# Patient Record
Sex: FEMALE | URBAN - METROPOLITAN AREA
[De-identification: names, ages, dates, MRNs, and addresses within clinical notes are randomized per-mention and may not be internally consistent; named-entity substitution may affect disease eponyms.]

---

## 2023-12-20 ENCOUNTER — APPOINTMENT (RX ONLY)
Dept: URBAN - METROPOLITAN AREA CLINIC 87 | Facility: CLINIC | Age: 23
Setting detail: DERMATOLOGY
End: 2023-12-20

## 2023-12-20 DIAGNOSIS — D49.2 NEOPLASM OF UNSPECIFIED BEHAVIOR OF BONE, SOFT TISSUE, AND SKIN: ICD-10-CM

## 2023-12-20 PROCEDURE — ? PHOTO-DOCUMENTATION

## 2023-12-20 PROCEDURE — ? ADDITIONAL NOTES

## 2023-12-20 PROCEDURE — 99202 OFFICE O/P NEW SF 15 MIN: CPT

## 2023-12-20 PROCEDURE — ? COUNSELING

## 2023-12-20 ASSESSMENT — LOCATION DETAILED DESCRIPTION DERM: LOCATION DETAILED: LEFT SUPERIOR POSTERIOR NECK

## 2023-12-20 ASSESSMENT — LOCATION ZONE DERM: LOCATION ZONE: NECK

## 2023-12-20 ASSESSMENT — LOCATION SIMPLE DESCRIPTION DERM: LOCATION SIMPLE: POSTERIOR NECK

## 2023-12-20 NOTE — PROCEDURE: ADDITIONAL NOTES
Additional Notes: Discussed realistic expectations in length and detail \\n\\nSpoke with Dr. Santiago and Dr. Chacon, both recommended given size of mass pt should be referred to [facility] and Ne[facility] Surgeon. Pt voiced understanding and will find a provider in her network \\n\\nRequested pt bring copy of last ultrasound results prior to excision .  Pt voiced understanding
Detail Level: Simple
Render Risk Assessment In Note?: no

## 2023-12-20 NOTE — HPI: NODULE (SMALL BUMP UNDERNEATH SKIN)
Is This A New Presentation, Or A Follow-Up?: Nodule
How Severe Is Your Nodule?: moderate
Additional History: \\n\\n\\nStarted the size of a marble.\\n\\nSeen by 3 different physicians.  Ultrasound last year at [facility]; fatty tissue